# Patient Record
Sex: MALE | Race: WHITE | NOT HISPANIC OR LATINO | Employment: FULL TIME | URBAN - METROPOLITAN AREA
[De-identification: names, ages, dates, MRNs, and addresses within clinical notes are randomized per-mention and may not be internally consistent; named-entity substitution may affect disease eponyms.]

---

## 2018-06-12 ENCOUNTER — HOSPITAL ENCOUNTER (EMERGENCY)
Facility: MEDICAL CENTER | Age: 48
End: 2018-06-12
Attending: EMERGENCY MEDICINE
Payer: COMMERCIAL

## 2018-06-12 VITALS
RESPIRATION RATE: 14 BRPM | HEIGHT: 68 IN | TEMPERATURE: 98.4 F | SYSTOLIC BLOOD PRESSURE: 115 MMHG | HEART RATE: 90 BPM | OXYGEN SATURATION: 95 % | WEIGHT: 180.78 LBS | BODY MASS INDEX: 27.4 KG/M2 | DIASTOLIC BLOOD PRESSURE: 66 MMHG

## 2018-06-12 DIAGNOSIS — S90.425A BLISTER OF TOE OF LEFT FOOT WITHOUT INFECTION, INITIAL ENCOUNTER: ICD-10-CM

## 2018-06-12 PROCEDURE — 99283 EMERGENCY DEPT VISIT LOW MDM: CPT

## 2018-06-12 ASSESSMENT — PAIN SCALES - GENERAL: PAINLEVEL_OUTOF10: 8

## 2018-06-12 ASSESSMENT — LIFESTYLE VARIABLES: DO YOU DRINK ALCOHOL: NO

## 2018-06-12 NOTE — ED PROVIDER NOTES
"ED Provider Note    Scribed for Keegan Mayo M.D. by Valeria Ledezma. 6/12/2018  3:12 PM    Primary care provider: Pcp Pt States None  Means of arrival: Walk-In  History obtained from: Patient  History limited by: None    CHIEF COMPLAINT  Chief Complaint   Patient presents with   • Foot Pain     denies injury, pain x 2 days, reports that toes are rubbing against each other and is causing pain up to knee.       HPI  Héctor Cain is a 48 y.o. male who presents to the Emergency Department with Toe pain of the left foot onset a couple days ago. Patient states the pain radiates up to his calf and knee, making it difficult to walk.    REVIEW OF SYSTEMS  Pertinent positives include Toe pain of the left side, calf pain, knee pain.   Pertinent negatives include no other obvious injuries.   E.    PAST MEDICAL HISTORY  None noted    SURGICAL HISTORY  patient denies any surgical history    SOCIAL HISTORY  None noted    FAMILY HISTORY  None noted    CURRENT MEDICATIONS  None noted    ALLERGIES  No Known Allergies    PHYSICAL EXAM  VITAL SIGNS: /65   Pulse 89   Temp 36.9 °C (98.4 °F)   Resp 16   Ht 1.727 m (5' 8\")   Wt 82 kg (180 lb 12.4 oz)   SpO2 97%   BMI 27.49 kg/m²     Constitutional: Well developed, Well nourished, no acute distress, Non-toxic appearance.   HENT: Normocephalic, Atraumatic.  Oropharynx moist.   Eyes: PERRL, EOMI, Conjunctiva normal, No discharge.   CV: Good pulses  Thorax & Lungs: No respiratory distress.   Skin: Warm, Dry, No erythema, No rash.    Musculoskeletal: No major deformities noted.   Neurologic: Awake, alert. Moves all extremities spontaneously.  Psychiatric: Affect normal, Mood normal.  Extremities: Blister to the medial aspect of the left 5th toe, blister on lateral aspect of left 4th toe, mild lower extremity edema, slight tenderness to left calf, full range of motion of left knee.    COURSE & MEDICAL DECISION MAKING  Nursing notes, VS, PMSFHx reviewed in " chart.    3:12 PM - Patient seen and examined at bedside. Patient was advised to put band-aids over his blisters and corns to prevent them from rubbing against each other. There are npo signs of infection. The patient will be discharged with an information sheet on blisters and should return if symptoms worsen or if new symptoms arise. The patient understands and agrees to plan.     Decision Making:  Blisters to the toes, put a Band-Aid on the area, will discharge the patient home    The patient will return for new or worsening symptoms and is stable at the time of discharge.    DISPOSITION:  Patient will be discharged home in stable condition.    FOLLOW UP:  [facility], Emergency Dept  [address] [address] [phone]    If symptoms worsen      FINAL IMPRESSION  1. Blister of toe of left foot without infection, initial encounter          Valeria MA (Scribe), am scribing for, and in the presence of, Keegan Mayo M.D..    Electronically signed by: Valeria Ledezma (Florentin), 6/12/2018    IKeegan M.D. personally performed the services described in this documentation, as scribed by Valeria Ledezma in my presence, and it is both accurate and complete.    The note accurately reflects work and decisions made by me.  Keegan Mayo  6/12/2018  9:37 PM

## 2018-06-12 NOTE — ED TRIAGE NOTES
"Chief Complaint   Patient presents with   • Foot Pain     denies injury, pain x 2 days, reports that toes are rubbing against each other and is causing pain up to knee.     Blood pressure 118/65, pulse 89, temperature 36.9 °C (98.4 °F), resp. rate 16, height 1.727 m (5' 8\"), weight 82 kg (180 lb 12.4 oz), SpO2 97 %.    Pt informed of wait times. Educated on triage process.  Asked to return to triage RN for any new or worsening of symptoms. Thanked for patience.        "

## 2018-06-12 NOTE — ED NOTES
Patient was educated on discharge instructions.  Patient was informed about diagnosis, symptom management, risks, and home care instructions.  Patient verbalized understanding and signed discharge instructions.Copy of discharge instructions in chart.  Patient ambulated out with steady gait. Patient has personal belongings.

## 2018-06-12 NOTE — DISCHARGE INSTRUCTIONS
Blisters, Adult  A blister is a raised bubble of skin filled with liquid. Blisters often develop in an area of the skin that repeatedly rubs or presses against another surface (friction blister). Friction blisters can occur on any part of the body, but they usually develop on the hands or feet. Long-term pressure on the same area of the skin can also lead to areas of hardened skin (calluses).  What are the causes?  A blister can be caused by:  · An injury.  · A burn.  · An allergic reaction.  · An infection.  · Exposure to irritating chemicals.  · Friction, especially in an area with a lot of heat and moisture.  Friction blisters often result from:  · Sports.  · Repetitive activities.  · Using tools and doing other activities without wearing gloves.  · Shoes that are too tight or too loose.  What are the signs or symptoms?  A blister is often round and looks like a bump. It may:  · Itch.  · Be painful to the touch.  Before a blister forms, the skin may:  · Become red.  · Feel warm.  · Itch.  · Be painful to the touch.  How is this diagnosed?  A blister is diagnosed with a physical exam.  How is this treated?  Treatment usually involves protecting the area where the blister has formed until the skin has healed. Other treatments may include:  · A bandage (dressing) to cover the blister.  · Extra padding around and over the blister, so that it does not rub on anything.  · Antibiotic ointment.  Most blisters break open, dry up, and go away on their own within 1-2 weeks. Blisters that are very painful may be drained before they break open on their own. If the blister is large or painful, it can be drained by:  1. Sterilizing a small needle with rubbing alcohol.  2. Washing your hands with soap and water.  3. Inserting the needle in the edge of the blister to make a small hole. Some fluid will drain out of the hole. Let the top or roof of the blister stay in place. This helps the skin heal.  4. Washing the blister with  mild soap and water.  5. Covering the blister with antibiotic ointment, if prescribed by your health care provider, and a dressing.  Some blisters may need to be drained by a health care provider.  Follow these instructions at home:  · Protect the area where the blister has formed as told by your health care provider.  · Keep your blister clean and dry. This helps to prevent infection.  · Do not pop your blister. This can cause infection.  · If you were prescribed an antibiotic, use it as told by your health care provider. Do not stop using the antibiotic even if your condition improves.  · Wear different shoes until the blister heals.  · Avoid the activity that caused the blister until your blister heals.  · Check your blister every day for signs of infection. Check for:  ¨ More redness, swelling, or pain.  ¨ More fluid or blood.  ¨ Warmth.  ¨ Pus or a bad smell.  ¨ The blister getting better and then getting worse.  How is this prevented?  Taking these steps can help to prevent blisters that are caused by friction. Make sure you:  · Wear comfortable shoes that fit well.  · Always wear socks with shoes.  · Wear extra socks or use tape, bandages, or pads over blister-prone areas as needed. You may also apply petroleum jelly under bandages in blister-prone areas.  · Wear protective gear, such as gloves, when participating in sports or activities that can cause blisters.  · Wear loose-fitting, moisture-wicking clothes when participating in sports or activities.  · Use powders as needed to keep your feet dry.  Contact a health care provider if:  · You have more redness, swelling, or pain around your blister.  · You have more fluid or blood coming from your blister.  · Your blister feels warm to the touch.  · You have pus or a bad smell coming from your blister.  · You have a fever or chills.  · Your blister gets better and then it gets worse.  This information is not intended to replace advice given to you by your  health care provider. Make sure you discuss any questions you have with your health care provider.  Document Released: 01/25/2006 Document Revised: 08/16/2017 Document Reviewed: 06/30/2017  Elsevier Interactive Patient Education © 2017 [employer] Inc[employer]

## 2018-06-25 ENCOUNTER — HOSPITAL ENCOUNTER (EMERGENCY)
Facility: MEDICAL CENTER | Age: 48
End: 2018-06-25
Attending: EMERGENCY MEDICINE
Payer: COMMERCIAL

## 2018-06-25 ENCOUNTER — APPOINTMENT (OUTPATIENT)
Dept: RADIOLOGY | Facility: MEDICAL CENTER | Age: 48
End: 2018-06-25
Attending: EMERGENCY MEDICINE
Payer: COMMERCIAL

## 2018-06-25 VITALS
WEIGHT: 171.96 LBS | RESPIRATION RATE: 18 BRPM | OXYGEN SATURATION: 98 % | HEIGHT: 68 IN | HEART RATE: 66 BPM | TEMPERATURE: 98.3 F | DIASTOLIC BLOOD PRESSURE: 64 MMHG | SYSTOLIC BLOOD PRESSURE: 110 MMHG | BODY MASS INDEX: 26.06 KG/M2

## 2018-06-25 DIAGNOSIS — R42 DIZZINESS: ICD-10-CM

## 2018-06-25 DIAGNOSIS — J01.90 ACUTE NON-RECURRENT SINUSITIS, UNSPECIFIED LOCATION: ICD-10-CM

## 2018-06-25 DIAGNOSIS — R09.81 NASAL CONGESTION: ICD-10-CM

## 2018-06-25 LAB
ALBUMIN SERPL BCP-MCNC: 4 G/DL (ref 3.2–4.9)
ALBUMIN/GLOB SERPL: 1.5 G/DL
ALP SERPL-CCNC: 73 U/L (ref 30–99)
ALT SERPL-CCNC: 25 U/L (ref 2–50)
ANION GAP SERPL CALC-SCNC: 6 MMOL/L (ref 0–11.9)
AST SERPL-CCNC: 23 U/L (ref 12–45)
BASOPHILS # BLD AUTO: 0.5 % (ref 0–1.8)
BASOPHILS # BLD: 0.04 K/UL (ref 0–0.12)
BILIRUB SERPL-MCNC: 0.7 MG/DL (ref 0.1–1.5)
BUN SERPL-MCNC: 17 MG/DL (ref 8–22)
CALCIUM SERPL-MCNC: 9.1 MG/DL (ref 8.5–10.5)
CHLORIDE SERPL-SCNC: 104 MMOL/L (ref 96–112)
CO2 SERPL-SCNC: 29 MMOL/L (ref 20–33)
CREAT SERPL-MCNC: 0.95 MG/DL (ref 0.5–1.4)
EKG IMPRESSION: NORMAL
EOSINOPHIL # BLD AUTO: 0.14 K/UL (ref 0–0.51)
EOSINOPHIL NFR BLD: 1.6 % (ref 0–6.9)
ERYTHROCYTE [DISTWIDTH] IN BLOOD BY AUTOMATED COUNT: 37.2 FL (ref 35.9–50)
GLOBULIN SER CALC-MCNC: 2.7 G/DL (ref 1.9–3.5)
GLUCOSE SERPL-MCNC: 98 MG/DL (ref 65–99)
HCT VFR BLD AUTO: 47.9 % (ref 42–52)
HGB BLD-MCNC: 16.9 G/DL (ref 14–18)
IMM GRANULOCYTES # BLD AUTO: 0.03 K/UL (ref 0–0.11)
IMM GRANULOCYTES NFR BLD AUTO: 0.3 % (ref 0–0.9)
LYMPHOCYTES # BLD AUTO: 1.95 K/UL (ref 1–4.8)
LYMPHOCYTES NFR BLD: 22.1 % (ref 22–41)
MCH RBC QN AUTO: 31.4 PG (ref 27–33)
MCHC RBC AUTO-ENTMCNC: 35.3 G/DL (ref 33.7–35.3)
MCV RBC AUTO: 88.9 FL (ref 81.4–97.8)
MONOCYTES # BLD AUTO: 0.65 K/UL (ref 0–0.85)
MONOCYTES NFR BLD AUTO: 7.4 % (ref 0–13.4)
NEUTROPHILS # BLD AUTO: 6 K/UL (ref 1.82–7.42)
NEUTROPHILS NFR BLD: 68.1 % (ref 44–72)
NRBC # BLD AUTO: 0 K/UL
NRBC BLD-RTO: 0 /100 WBC
PLATELET # BLD AUTO: 417 K/UL (ref 164–446)
PMV BLD AUTO: 9.1 FL (ref 9–12.9)
POTASSIUM SERPL-SCNC: 4.7 MMOL/L (ref 3.6–5.5)
PROT SERPL-MCNC: 6.7 G/DL (ref 6–8.2)
RBC # BLD AUTO: 5.39 M/UL (ref 4.7–6.1)
SODIUM SERPL-SCNC: 139 MMOL/L (ref 135–145)
TSH SERPL DL<=0.005 MIU/L-ACNC: 1.25 UIU/ML (ref 0.38–5.33)
WBC # BLD AUTO: 8.8 K/UL (ref 4.8–10.8)

## 2018-06-25 PROCEDURE — 80053 COMPREHEN METABOLIC PANEL: CPT

## 2018-06-25 PROCEDURE — 84443 ASSAY THYROID STIM HORMONE: CPT

## 2018-06-25 PROCEDURE — 99284 EMERGENCY DEPT VISIT MOD MDM: CPT

## 2018-06-25 PROCEDURE — 85025 COMPLETE CBC W/AUTO DIFF WBC: CPT

## 2018-06-25 PROCEDURE — 93005 ELECTROCARDIOGRAM TRACING: CPT | Performed by: EMERGENCY MEDICINE

## 2018-06-25 PROCEDURE — 70450 CT HEAD/BRAIN W/O DYE: CPT

## 2018-06-25 RX ORDER — AMOXICILLIN AND CLAVULANATE POTASSIUM 875; 125 MG/1; MG/1
1 TABLET, FILM COATED ORAL 2 TIMES DAILY
Qty: 20 TAB | Refills: 0 | Status: SHIPPED | OUTPATIENT
Start: 2018-06-25 | End: 2018-07-05

## 2018-06-25 NOTE — ED NOTES
Discharge instructions given to patient, prescriptions provided, a verbal understanding of all instructions was stated. IV removed, cathlon intact, site without s/s of infection. Pt preferred to walk out accompanied by self VSS,  all belongings accounted for.

## 2018-06-25 NOTE — DISCHARGE INSTRUCTIONS
Follow-up with primary care this week for reevaluation, to establish care, for medication management close blood pressure monitoring.    Augmentin twice daily for 10 days for sinus infection.  Over-the-counter medication such as Afrin, as needed for congestion.  Encourage oral fluid hydration.  A coolmist humidifier may be beneficial as well.    Return to the emergency department for headache, altered mental status, syncope, fever or other new concerns.    Sinusitis, Adult  Sinusitis is soreness and inflammation of your sinuses. Sinuses are hollow spaces in the bones around your face. They are located:  · Around your eyes.  · In the middle of your forehead.  · Behind your nose.  · In your cheekbones.  Your sinuses and nasal passages are lined with a stringy fluid (mucus). Mucus normally drains out of your sinuses. When your nasal tissues get inflamed or swollen, the mucus can get trapped or blocked so air cannot flow through your sinuses. This lets bacteria, viruses, and funguses grow, and that leads to infection.  Follow these instructions at home:  Medicines  · Take, use, or apply over-the-counter and prescription medicines only as told by your doctor. These may include nasal sprays.  · If you were prescribed an antibiotic medicine, take it as told by your doctor. Do not stop taking the antibiotic even if you start to feel better.  Hydrate and Humidify  · Drink enough water to keep your pee (urine) clear or pale yellow.  · Use a cool mist humidifier to keep the humidity level in your home above 50%.  · Breathe in steam for 10-15 minutes, 3-4 times a day or as told by your doctor. You can do this in the bathroom while a hot shower is running.  · Try not to spend time in cool or dry air.  Rest  · Rest as much as possible.  · Sleep with your head raised (elevated).  · Make sure to get enough sleep each night.  General instructions  · Put a warm, moist washcloth on your face 3-4 times a day or as told by your doctor.  This will help with discomfort.  · Wash your hands often with soap and water. If there is no soap and water, use hand .  · Do not smoke. Avoid being around people who are smoking (secondhand smoke).  · Keep all follow-up visits as told by your doctor. This is important.  Contact a doctor if:  · You have a fever.  · Your symptoms get worse.  · Your symptoms do not get better within 10 days.  Get help right away if:  · You have a very bad headache.  · You cannot stop throwing up (vomiting).  · You have pain or swelling around your face or eyes.  · You have trouble seeing.  · You feel confused.  · Your neck is stiff.  · You have trouble breathing.  This information is not intended to replace advice given to you by your health care provider. Make sure you discuss any questions you have with your health care provider.  Document Released: 06/05/2009 Document Revised: 08/13/2017 Document Reviewed: 10/12/2016  [employer] Interactive Patient Education © 2017 [employer]      Dizziness  Dizziness is a common problem. It makes you feel unsteady or lightheaded. You may feel like you are about to pass out (faint). Dizziness can lead to injury if you stumble or fall. Anyone can get dizzy, but dizziness is more common in older adults. This condition can be caused by a number of things, including:  · Medicines.  · Dehydration.  · Illness.  Follow these instructions at home:  Following these instructions may help with your condition:  Eating and drinking  · Drink enough fluid to keep your pee (urine) clear or pale yellow. This helps to keep you from getting dehydrated. Try to drink more clear fluids, such as water.  · Do not drink alcohol.  · Limit how much caffeine you drink or eat if told by your doctor.  · Limit how much salt you drink or eat if told by your doctor.  Activity  · Avoid making quick movements.  ¨ When you stand up from sitting in a chair, steady yourself until you feel okay.  ¨ In the morning, first sit up  on the side of the bed. When you feel okay, stand slowly while you hold onto something. Do this until you know that your balance is fine.  · Move your legs often if you need to  one place for a long time. Tighten and relax your muscles in your legs while you are standing.  · Do not drive or use heavy machinery if you feel dizzy.  · Avoid bending down if you feel dizzy. Place items in your home so that they are easy for you to reach without leaning over.  Lifestyle  · Do not use any tobacco products, including cigarettes, chewing tobacco, or electronic cigarettes. If you need help quitting, ask your doctor.  · Try to lower your stress level, such as with yoga or meditation. Talk with your doctor if you need help.  General instructions  · Watch your dizziness for any changes.  · Take medicines only as told by your doctor. Talk with your doctor if you think that your dizziness is caused by a medicine that you are taking.  · Tell a friend or a family member that you are feeling dizzy. If he or she notices any changes in your behavior, have this person call your doctor.  · Keep all follow-up visits as told by your doctor. This is important.  Contact a doctor if:  · Your dizziness does not go away.  · Your dizziness or light-headedness gets worse.  · You feel sick to your stomach (nauseous).  · You have trouble hearing.  · You have new symptoms.  · You are unsteady on your feet or you feel like the room is spinning.  Get help right away if:  · You throw up (vomit) or have diarrhea and are unable to eat or drink anything.  · You have trouble:  ¨ Talking.  ¨ Walking.  ¨ Swallowing.  ¨ Using your arms, hands, or legs.  · You feel generally weak.  · You are not thinking clearly or you have trouble forming sentences. It may take a friend or family member to notice this.  · You have:  ¨ Chest pain.  ¨ Pain in your belly (abdomen).  ¨ Shortness of breath.  ¨ Sweating.  · Your vision changes.  · You are bleeding.  · You  have a headache.  · You have neck pain or a stiff neck.  · You have a fever.  This information is not intended to replace advice given to you by your health care provider. Make sure you discuss any questions you have with your health care provider.  Document Released: 12/06/2012 Document Revised: 05/25/2017 Document Reviewed: 12/14/2015  [employer] Interactive Patient Education © 2017 [employer] Inc.

## 2018-06-25 NOTE — ED TRIAGE NOTES
"Chief Complaint   Patient presents with   • Fatigue     Pt states he feels weak and nauseous starting yesterday.    • Congestion     Pt states he has sinus congestion for several weeks but denies allergies.     /76   Pulse 74   Temp 36.8 °C (98.3 °F)   Resp 18   Ht 1.727 m (5' 8\")   Wt 78 kg (171 lb 15.3 oz)   SpO2 98%   BMI 26.15 kg/m²   Pt placed back in lobby, educated on triage process, and told to inform staff of any change in condition.     "

## 2018-06-25 NOTE — ED PROVIDER NOTES
"ED Provider Note    CHIEF COMPLAINT  Chief Complaint   Patient presents with   • Fatigue     Pt states he feels weak and nauseous starting yesterday.    • Congestion     Pt states he has sinus congestion for several weeks but denies allergies.       HPI  Héctor Cain is a 48 y.o. male who ambulates to the emergency department for dizziness.  Patient describes lightheaded \"I feel like I am going to pass out, like it cannot stand straight.\"  Patient states that \"I thought I was just low on fluid so I drink water and Gatorade yesterday but it does not seem to help.\"  Patient also describes \"congestion and a sinus thing,\" for 3 weeks.  Nasal congestion, facial pressure.  Denies fever chills.  Denies nausea or vomiting.  Denies visual changes, double vision or blurred vision.  Denies slurred speech or difficulty speaking.  Denies focal extremity weakness or paresthesias despite generalized weakness and fatigue for 2 days as well.  Denies chest pain, palpitations, shortness of breath or syncope.  Denies history of similar symptoms.    REVIEW OF SYSTEMS  See HPI for further details. All other systems are negative.     PAST MEDICAL HISTORY   Denies    SOCIAL HISTORY  Social History     Social History Main Topics   • Smoking status: Current Some Day Smoker     Types: Cigarettes   • Smokeless tobacco: Never Used   • Alcohol use Yes      Comment: on occaison   • Drug use: No   • Sexual activity: Not on file       SURGICAL HISTORY  patient denies any surgical history    CURRENT MEDICATIONS  Home Medications     Reviewed by Hayley Fry, Student (Nurse Apprentice) on 06/25/18 at 0735  Med List Status: Complete   Medication Last Dose Status        Patient Karson Taking any Medications                       ALLERGIES  No Known Allergies    PHYSICAL EXAM  VITAL SIGNS: /64   Pulse 72   Temp 36.8 °C (98.3 °F)   Resp 18   Ht 1.727 m (5' 8\")   Wt 78 kg (171 lb 15.3 oz)   SpO2 98%   BMI 26.15 kg/m²   Pulse ox " interpretation: I interpret this pulse ox as normal.  Constitutional: Alert in no apparent distress.  Flat affect.  Cooperative.  HENT: Normocephalic, atraumatic. Bilateral external ears normal, TMs clear bilaterally.  Nose normal. Moist mucous membranes.  Oropharynx within normal limits, no erythema, edema or exudate.  Mild tenderness to percussion bilateral maxillary sinuses.  Eyes: Pupils are equal and reactive, 4 to 2 bilaterally.  Conjunctiva normal.  EOMI.  No nystagmus.  Neck: Normal range of motion, Supple. No meningeal irritation.  Lymphatic: No lymphadenopathy noted.   Cardiovascular: Regular rate and rhythm, no murmurs. Distal pulses intact.  No peripheral edema.  Thorax & Lungs: Normal breath sounds.  No wheezing/rales/ronchi. No increased work of breathing  Abdomen: Soft, non-distended, non-tender to palpation. No palpable or pulsatile masses.  Skin: Warm, Dry, No erythema, No rash.   Musculoskeletal: Good range of motion in all major joints.  Neurologic: Alert and oriented ×4.  Speech clear and cohesive.  No facial droop.  5 out of 5 strength in 4 extremities.  No pronator drift.  Heel to shin and finger to nose intact bilaterally.  Sensation intact to light touch equally at 4 extremities.  Psychiatric: Flat affect otherwise judgment normal, Mood normal.       DIAGNOSTIC STUDIES / PROCEDURES    EKG  I interpreted this EKG myself.  This is a 12-lead study.  The rhythm is sinus with a rate of 64.  There are no ST segment nor T wave abnormalities.  Normal intervals.  Interpretation: No ST segment elevation myocardial infarction.  No ectopy.  No arrhythmia.  No previous for comparison.    LABS  Results for orders placed or performed during the hospital encounter of 06/25/18   CBC WITH DIFFERENTIAL   Result Value Ref Range    WBC 8.8 4.8 - 10.8 K/uL    RBC 5.39 4.70 - 6.10 M/uL    Hemoglobin 16.9 14.0 - 18.0 g/dL    Hematocrit 47.9 42.0 - 52.0 %    MCV 88.9 81.4 - 97.8 fL    MCH 31.4 27.0 - 33.0 pg    MCHC  35.3 33.7 - 35.3 g/dL    RDW 37.2 35.9 - 50.0 fL    Platelet Count 417 164 - 446 K/uL    MPV 9.1 9.0 - 12.9 fL    Neutrophils-Polys 68.10 44.00 - 72.00 %    Lymphocytes 22.10 22.00 - 41.00 %    Monocytes 7.40 0.00 - 13.40 %    Eosinophils 1.60 0.00 - 6.90 %    Basophils 0.50 0.00 - 1.80 %    Immature Granulocytes 0.30 0.00 - 0.90 %    Nucleated RBC 0.00 /100 WBC    Neutrophils (Absolute) 6.00 1.82 - 7.42 K/uL    Lymphs (Absolute) 1.95 1.00 - 4.80 K/uL    Monos (Absolute) 0.65 0.00 - 0.85 K/uL    Eos (Absolute) 0.14 0.00 - 0.51 K/uL    Baso (Absolute) 0.04 0.00 - 0.12 K/uL    Immature Granulocytes (abs) 0.03 0.00 - 0.11 K/uL    NRBC (Absolute) 0.00 K/uL   COMP METABOLIC PANEL   Result Value Ref Range    Sodium 139 135 - 145 mmol/L    Potassium 4.7 3.6 - 5.5 mmol/L    Chloride 104 96 - 112 mmol/L    Co2 29 20 - 33 mmol/L    Anion Gap 6.0 0.0 - 11.9    Glucose 98 65 - 99 mg/dL    Bun 17 8 - 22 mg/dL    Creatinine 0.95 0.50 - 1.40 mg/dL    Calcium 9.1 8.5 - 10.5 mg/dL    AST(SGOT) 23 12 - 45 U/L    ALT(SGPT) 25 2 - 50 U/L    Alkaline Phosphatase 73 30 - 99 U/L    Total Bilirubin 0.7 0.1 - 1.5 mg/dL    Albumin 4.0 3.2 - 4.9 g/dL    Total Protein 6.7 6.0 - 8.2 g/dL    Globulin 2.7 1.9 - 3.5 g/dL    A-G Ratio 1.5 g/dL   TSH   Result Value Ref Range    TSH 1.250 0.380 - 5.330 uIU/mL   ESTIMATED GFR   Result Value Ref Range    GFR If African American >60 >60 mL/min/1.73 m 2    GFR If Non African American >60 >60 mL/min/1.73 m 2   EKG (NOW)   Result Value Ref Range    Report       [facility] Emergency Dept.    Test Date:  2018  Pt Name:    DANIEL SARMIENTO                 Department: ER  MRN:        [id number]                      Room:       [facility]  Gender:     Male                         Technician: [id number]  :        1970                   Requested By:KARIME IVY  Order #:    [id number]                    Reading MD: KARIME IVY, DO    Measurements  Intervals                                 Axis  Rate:       64                           P:          66  OH:         164                          QRS:        68  QRSD:       96                           T:          58  QT:         412  QTc:        425    Interpretive Statements  SINUS RHYTHM  No previous ECG available for comparison    Electronically Signed On 6- 11:00:13 PDT by NEEMA IVY DO         RADIOLOGY  CT-HEAD W/O   Final Result         1. No evidence of acute cerebral infarction, hemorrhage or mass lesion.      2. Left maxillary and ethmoid sinusitis.          COURSE & MEDICAL DECISION MAKING  Nursing notes and vital signs were reviewed. (See chart for details)  The patients records were reviewed, history was obtained from the patient ;     ED evaluation for congestion, dizziness and fatigue is mostly unrevealing, however symptomatology may be secondary to the sinusitis.  Patient with symptoms for 2-3 weeks, clinical sinusitis confirmed with CT head.  Otherwise CT unrevealing, patient is neurologically intact.  No exam evidence for cerebellar dysfunction.  Labs otherwise unremarkable, no electrolyte derangement.  EKG within normal limits, no ischemia or arrhythmia, continuous telemetry without ectopy or arrhythmia.  Vital signs are stable without fever tachycardia, blood pressures well controlled.  Patient ambulates independently.    Patient is stable for discharge at this time, anticipatory guidance provided, Augmentin for 10 days, over-the-counter medications for nasal congestion, close follow-up is encouraged, and strict ED return instructions have been detailed. Patient is agreeable to the disposition and plan.    Patient's blood pressure was elevated in the emergency department, and has been referred to primary care for close monitoring.      FINAL IMPRESSION  (J01.90) Acute non-recurrent sinusitis, unspecified location  (R42) Dizziness  (R09.81) Nasal congestion      Electronically signed by: Neema Ivy, 6/25/2018 8:52  AM      This dictation was created using voice recognition software. The accuracy of the dictation is limited to the abilities of the software. I expect there may be some errors of grammar and possibly content. The nursing notes were reviewed and certain aspects of this information were incorporated into this note.

## 2018-07-03 ENCOUNTER — HOSPITAL ENCOUNTER (EMERGENCY)
Facility: MEDICAL CENTER | Age: 48
End: 2018-07-03
Attending: EMERGENCY MEDICINE
Payer: COMMERCIAL

## 2018-07-03 VITALS
WEIGHT: 178.79 LBS | RESPIRATION RATE: 14 BRPM | TEMPERATURE: 97.5 F | HEART RATE: 69 BPM | DIASTOLIC BLOOD PRESSURE: 71 MMHG | BODY MASS INDEX: 27.1 KG/M2 | SYSTOLIC BLOOD PRESSURE: 106 MMHG | OXYGEN SATURATION: 96 % | HEIGHT: 68 IN

## 2018-07-03 DIAGNOSIS — S46.212A BICEPS STRAIN, LEFT, INITIAL ENCOUNTER: ICD-10-CM

## 2018-07-03 DIAGNOSIS — M54.42 ACUTE LEFT-SIDED LOW BACK PAIN WITH LEFT-SIDED SCIATICA: ICD-10-CM

## 2018-07-03 PROCEDURE — 99283 EMERGENCY DEPT VISIT LOW MDM: CPT

## 2018-07-03 RX ORDER — CYCLOBENZAPRINE HCL 10 MG
10 TABLET ORAL 3 TIMES DAILY PRN
Qty: 30 TAB | Refills: 0 | Status: SHIPPED | OUTPATIENT
Start: 2018-07-03

## 2018-07-03 NOTE — ED TRIAGE NOTES
"Chief Complaint   Patient presents with   • Hip Pain     x a couple of days, unknown injury. pt reports that he is currently homeless.      Pt reports that pain is predominately on left side. Odd affect, but pleasant.  Blood pressure 106/71, pulse 69, temperature 36.4 °C (97.5 °F), resp. rate 14, height 1.727 m (5' 8\"), weight 81.1 kg (178 lb 12.7 oz), SpO2 96 %.    Pt informed of wait times. Educated on triage process.  Asked to return to triage RN for any new or worsening of symptoms. Thanked for patience.        "

## 2018-07-03 NOTE — ED NOTES
Pt received discharge, follow up and return to ED instructions; pt verbalized understanding. Pt denied any needs/pain. Pt ambulated with a steady gait. Pt received (1) paper rx at discharge.

## 2018-07-03 NOTE — ED PROVIDER NOTES
"ED Provider Note    Scribed for Ilana Matias M.D. by Valeria Ledezma. 7/3/2018, 9:44 AM.    Primary care provider: Pcp Pt States None  Means of arrival: Walk-in  History obtained from: Patient  History limited by: None    CHIEF COMPLAINT  Chief Complaint   Patient presents with   • Hip Pain     x a couple of days, unknown injury. pt reports that he is currently homeless.        HPI  Héctor Cain is a 48 y.o. homeless male who presents to the Emergency Department for evaluation of left arm and left hip pain, onset yesterday. Patient describes his hip pain as \"dull, sharp\". He states he has been walking more. No relief in with Naprosyn or Motrin. Patient has experienced this in the past and his symptoms typically improve with rest, however he states he cannot rest well enough in a homeless shelter. The pain radiates to his left buttocks, down his left leg, and intermittently into this left shoulder. Patient denies any recent falls or injuries. No weakness or numbness in left leg, denies incontinence, fever, chills.. No chest pain.     REVIEW OF SYSTEMS  Pertinent positives include left arm pain, left hip pain with radiation to left buttocks, left leg, and left shoulder. Pertinent negatives include no falls, weakness, numbness, chest pain. See HPI for further details. E.     PAST MEDICAL HISTORY       SURGICAL HISTORY  patient denies any surgical history    SOCIAL HISTORY  Social History   Substance Use Topics   • Smoking status: Current Some Day Smoker     Types: Cigarettes   • Smokeless tobacco: Never Used   • Alcohol use Yes      Comment: on occaison      History   Drug Use No       FAMILY HISTORY  None noted    CURRENT MEDICATIONS  No current facility-administered medications for this encounter.     Current Outpatient Prescriptions:   •  cyclobenzaprine (FLEXERIL) 10 MG Tab, Take 1 Tab by mouth 3 times a day as needed for Muscle Spasms., Disp: 30 Tab, Rfl: 0  •  amoxicillin-clavulanate (AUGMENTIN) " "875-125 MG Tab, Take 1 Tab by mouth 2 times a day for 10 days., Disp: 20 Tab, Rfl: 0    ALLERGIES  No Known Allergies    PHYSICAL EXAM  VITAL SIGNS: /71   Pulse 69   Temp 36.4 °C (97.5 °F)   Resp 14   Ht 1.727 m (5' 8\")   Wt 81.1 kg (178 lb 12.7 oz)   SpO2 96%   BMI 27.19 kg/m²     General: WDWN, nontoxic appearing in NAD; A+Ox3; V/S as above, afebrile  Skin: warm and dry; good color; no rash   HEENT: NCAT; EOMs intact; PERRL; no scleral icterus   Neck: FROM; no meningismus, no LAD   Back: No midline point tenderness or step-offs. No evidence of trauma, no erythema or crepitus. No palpable muscle spasm.  Extremities: DAVIS x 4; no e/o trauma; no pedal edema. Patient complains of pain with flexion of left elbow, no bony-point tenderness or deformity.   Neurologic: CNs III-XII grossly intact; speech clear; distal sensation intact; strength 5/5 UE/LEs   Psychiatric: Appropriate affect, normal mood                                                          COURSE & MEDICAL DECISION MAKING  Pertinent Labs & Imaging studies reviewed. (See chart for details)    Héctor Cain is a 48 y.o. male who presents complaining of left buttock pain radiating into the left leg most likely consistent with sciatica.  No red flag signs or symptoms with regards to back pain.  I do not suspect cauda equina or epidural abscess.  Imaging is not indicated today given no history of trauma.     The patient will be discharged with continued Naprosyn and Flexeril and should return if symptoms worsen or if new symptoms arise such as increased pain, fevers, weakness, or redness. The patient understands and agrees to plan.       FINAL IMPRESSION  1. Acute left-sided low back pain with left-sided sciatica    2. Biceps strain, left, initial encounter          Valeria MA (Florentin), am scribing for, and in the presence of, Ilana Matias M.D..    Electronically signed by: Valeria Kim), 7/3/2018    Ilana MA" CAPRICE Matias. personally performed the services described in this documentation, as scribed by Valeria Ledezma in my presence, and it is both accurate and complete.    The note accurately reflects work and decisions made by me.  Ilana Matias  7/3/2018  10:07 AM

## 2018-07-03 NOTE — DISCHARGE INSTRUCTIONS
Continue with Naprosyn twice daily as needed for pain; make sure you take this with food and water  Take Flexeril as needed for muscle spasm  Return to the ER for fever, increased pain, weakness, incontinence, or other concerns  Establish care with a primary care provider at the [facility] or [facility]  Salon Pas topically for pain; may be purchased over-the-counter        Back Pain, Adult  Back pain is very common in adults. The cause of back pain is rarely dangerous and the pain often gets better over time. The cause of your back pain may not be known. Some common causes of back pain include:  · Strain of the muscles or ligaments supporting the spine.  · Wear and tear (degeneration) of the spinal disks.  · Arthritis.  · Direct injury to the back.  For many people, back pain may return. Since back pain is rarely dangerous, most people can learn to manage this condition on their own.  Follow these instructions at home:  Watch your back pain for any changes. The following actions may help to lessen any discomfort you are feeling:  · Remain active. It is stressful on your back to sit or  one place for long periods of time. Do not sit, drive, or  one place for more than 30 minutes at a time. Take short walks on even surfaces as soon as you are able. Try to increase the length of time you walk each day.  · Exercise regularly as directed by your health care provider. Exercise helps your back heal faster. It also helps avoid future injury by keeping your muscles strong and flexible.  · Do not stay in bed. Resting more than 1-2 days can delay your recovery.  · Pay attention to your body when you bend and lift. The most comfortable positions are those that put less stress on your recovering back. Always use proper lifting techniques, including:  ¨ Bending your knees.  ¨ Keeping the load close to your body.  ¨ Avoiding twisting.  · Find a comfortable position to sleep. Use a firm mattress and lie on your side  with your knees slightly bent. If you lie on your back, put a pillow under your knees.  · Avoid feeling anxious or stressed. Stress increases muscle tension and can worsen back pain. It is important to recognize when you are anxious or stressed and learn ways to manage it, such as with exercise.  · Take medicines only as directed by your health care provider. Over-the-counter medicines to reduce pain and inflammation are often the most helpful. Your health care provider may prescribe muscle relaxant drugs. These medicines help dull your pain so you can more quickly return to your normal activities and healthy exercise.  · Apply ice to the injured area:  ¨ Put ice in a plastic bag.  ¨ Place a towel between your skin and the bag.  ¨ Leave the ice on for 20 minutes, 2-3 times a day for the first 2-3 days. After that, ice and heat may be alternated to reduce pain and spasms.  · Maintain a healthy weight. Excess weight puts extra stress on your back and makes it difficult to maintain good posture.  Contact a health care provider if:  · You have pain that is not relieved with rest or medicine.  · You have increasing pain going down into the legs or buttocks.  · You have pain that does not improve in one week.  · You have night pain.  · You lose weight.  · You have a fever or chills.  Get help right away if:  · You develop new bowel or bladder control problems.  · You have unusual weakness or numbness in your arms or legs.  · You develop nausea or vomiting.  · You develop abdominal pain.  · You feel faint.  This information is not intended to replace advice given to you by your health care provider. Make sure you discuss any questions you have with your health care provider.  Document Released: 12/18/2006 Document Revised: 04/27/2017 Document Reviewed: 04/21/2015  [employer] Interactive Patient Education © 2017 [employer]

## 2018-07-09 ENCOUNTER — HOSPITAL ENCOUNTER (EMERGENCY)
Facility: MEDICAL CENTER | Age: 48
End: 2018-07-09
Attending: EMERGENCY MEDICINE
Payer: COMMERCIAL

## 2018-07-09 VITALS
HEART RATE: 89 BPM | DIASTOLIC BLOOD PRESSURE: 72 MMHG | HEIGHT: 68 IN | RESPIRATION RATE: 16 BRPM | TEMPERATURE: 99.4 F | SYSTOLIC BLOOD PRESSURE: 124 MMHG | OXYGEN SATURATION: 95 % | WEIGHT: 174.16 LBS | BODY MASS INDEX: 26.4 KG/M2

## 2018-07-09 DIAGNOSIS — G89.4 CHRONIC PAIN SYNDROME: ICD-10-CM

## 2018-07-09 PROCEDURE — 99283 EMERGENCY DEPT VISIT LOW MDM: CPT

## 2018-07-09 PROCEDURE — 99284 EMERGENCY DEPT VISIT MOD MDM: CPT

## 2018-07-09 RX ORDER — PREGABALIN 75 MG/1
75 CAPSULE ORAL 2 TIMES DAILY
Qty: 60 CAP | Refills: 0 | Status: SHIPPED | OUTPATIENT
Start: 2018-07-09 | End: 2018-08-08

## 2018-07-09 ASSESSMENT — ENCOUNTER SYMPTOMS
NEUROLOGICAL NEGATIVE: 1
NECK PAIN: 1

## 2018-07-09 NOTE — DISCHARGE INSTRUCTIONS
Chronic Pain Management  Managing chronic pain is not easy. The goal is to provide as much pain relief as possible. There are emotional as well as physical problems. Chronic pain may lead to symptoms of depression which magnify those of the pain.  Problems may include:  · Anxiety.  · Sleep disturbances.  · Confused thinking.  · Feeling cranky.  · Fatigue.  · Weight gain or loss.  Identify the source of the pain first, if possible. The pain may be masking another problem. Try to find a pain management specialist or clinic. Work with a team to create a treatment plan for you.  MEDICATIONS  · May include narcotics or opioids. Larger than normal doses may be needed to control your pain.  · Drugs for depression may help.  · Over-the-counter medicines may help for some conditions. These drugs may be used along with others for better pain relief.  · May be injected into sites such as the spine and joints. Injections may have to be repeated if they wear off.  THERAPY MAY INCLUDE:  · Working with a physical therapist to keep from getting stiff.  · Regular, gentle exercise.  · Cognitive or behavioral therapy.  · Using complementary or integrative medicine such as:  · Acupuncture.  · Massage, Reiki, or Rolfing.  · Aroma, color, light, or sound therapy.  · Group support.  FOR MORE INFORMATION  http://www.painfoundation.org.  American Chronic Pain Association http://www.thealpa.org.  Document Released: 01/25/2006 Document Revised: 03/11/2013 Document Reviewed: 03/05/2009  [employer]® Patient Information ©2014 [employer].      Neuropathic Pain  Introduction  Neuropathic pain is pain caused by damage to the nerves that are responsible for certain sensations in your body (sensory nerves). The pain can be caused by damage to:  · The sensory nerves that send signals to your spinal cord and brain (peripheral nervous system).  · The sensory nerves in your brain or spinal cord (central nervous system).  Neuropathic pain can make you  more sensitive to pain. What would be a minor sensation for most people may feel very painful if you have neuropathic pain. This is usually a long-term condition that can be difficult to treat. The type of pain can differ from person to person. It may start suddenly (acute), or it may develop slowly and last for a long time (chronic). Neuropathic pain may come and go as damaged nerves heal or may stay at the same level for years. It often causes emotional distress, loss of sleep, and a lower quality of life.  What are the causes?  The most common cause of damage to a sensory nerve is diabetes. Many other diseases and conditions can also cause neuropathic pain. Causes of neuropathic pain can be classified as:  · Toxic. Many drugs and chemicals can cause toxic damage. The most common cause of toxic neuropathic pain is damage from drug treatment for cancer (chemotherapy).  · Metabolic. This type of pain can happen when a disease causes imbalances that damage nerves. Diabetes is the most common of these diseases. Vitamin B deficiency caused by long-term alcohol abuse is another common cause.  · Traumatic. Any injury that cuts, crushes, or stretches a nerve can cause damage and pain. A common example is feeling pain after losing an arm or leg (phantom limb pain).  · Compression-related. If a sensory nerve gets trapped or compressed for a long period of time, the blood supply to the nerve can be cut off.  · Vascular. Many blood vessel diseases can cause neuropathic pain by decreasing blood supply and oxygen to nerves.  · Autoimmune. This type of pain results from diseases in which the body’s defense system mistakenly attacks sensory nerves. Examples of autoimmune diseases that can cause neuropathic pain include lupus and multiple sclerosis.  · Infectious. Many types of viral infections can damage sensory nerves and cause pain. Shingles infection is a common cause of this type of pain.  · Inherited. Neuropathic pain can be  a symptom of many diseases that are passed down through families (genetic).  What are the signs or symptoms?  The main symptom is pain. Neuropathic pain is often described as:  · Burning.  · Shock-like.  · Stinging.  · Hot or cold.  · Itching.  How is this diagnosed?  No single test can diagnose neuropathic pain. Your health care provider will do a physical exam and ask you about your pain. You may use a pain scale to describe how bad your pain is. You may also have tests to see if you have a high sensitivity to pain and to help find the cause and location of any sensory nerve damage. These tests may include:  · Imaging studies, such as:  ¨ X-rays.  ¨ CT scan.  ¨ MRI.  · Nerve conduction studies to test how well nerve signals travel through your sensory nerves (electrodiagnostic testing).  · Stimulating your sensory nerves through electrodes on your skin and measuring the response in your spinal cord and brain (somatosensory evoked potentials).  How is this treated?  Treatment for neuropathic pain may change over time. You may need to try different treatment options or a combination of treatments. Some options include:  · Over-the-counter pain relievers.  · Prescription medicines. Some medicines used to treat other conditions may also help neuropathic pain. These include medicines to:  ¨ Control seizures (anticonvulsants).  ¨ Relieve depression (antidepressants).  · Prescription-strength pain relievers (narcotics). These are usually used when other pain relievers do not help.  · Transcutaneous nerve stimulation (TENS). This uses electrical currents to block painful nerve signals. The treatment is painless.  · Topical and local anesthetics. These are medicines that numb the nerves. They can be injected as a nerve block or applied to the skin.  · Alternative treatments, such as:  ¨ Acupuncture.  ¨ Meditation.  ¨ Massage.  ¨ Physical therapy.  ¨ Pain management programs.  ¨ Counseling.  Follow these instructions at  home:  · Learn as much as you can about your condition.  · Take medicines only as directed by your health care provider.  · Work closely with all your health care providers to find what works best for you.  · Have a good support system at home.  · Consider joining a chronic pain support group.  Contact a health care provider if:  · Your pain treatments are not helping.  · You are having side effects from your medicines.  · You are struggling with fatigue, mood changes, depression, or anxiety.  This information is not intended to replace advice given to you by your health care provider. Make sure you discuss any questions you have with your health care provider.  Document Released: 09/14/2005 Document Revised: 07/07/2017 Document Reviewed: 05/28/2015  © 2017 Elsevier

## 2018-07-09 NOTE — ED NOTES
"Pt ambulates to triage with c/c \"pain from my left foot to my left knee to my left hip to my left shoulder\" since going on a walk this AM.  Pt states \"this pain is from the shelter being so cold at night.\"  A&ox4.  Ambulates slowly without difficulty.  Pt to lobby & advised to inform RN of any changes.    *Pt verbally upset with shelter- \"they make me leave during the day, I end up doing a lot of walking & that really hurts me.\"    "

## 2018-07-09 NOTE — ED NOTES
Pt received discharge instructions and prescription, pt understood all including follow up. Pt out to lobby with no difficulty

## 2018-07-09 NOTE — ED PROVIDER NOTES
ED Provider Note    Scribed for Dereje Neves M.D. by Alec Castillo. 7/9/2018, 2:25 PM.    Primary care provider: Pcp Pt States None  Means of arrival: walk in  History obtained from: patient  History limited by: none    CHIEF COMPLAINT  Chief Complaint   Patient presents with   • Pain     c/c left foot, left knee, left hip, left shoulder pain since walking this AM, denies trauma to area         HPI  Héctor Cain is a 48 y.o. male with a history of chronic musculoskeletal pain for 20 years who presents to the Emergency Department complaining of sudden left leg pain starting this morning. He describes his pain as severe. Patient localizes his pain to the distal lower left extremity and to the left hip. He states that his pain came on suddenly after walking this morning. Patient reports associated left sided neck pain, and left shoulder pain. He is homeless and states that when he is put out from the shelter during the day, he ends up walking a lot which exacerbates his chronic pain. Patient states that he takes Naproxen and flexeril for his chronic pain, but does not have a primary and is obtaining these medications through the emergency department. He denies trauma, numbness.     REVIEW OF SYSTEMS  Review of Systems   Musculoskeletal: Positive for neck pain.        Shoulder pain, hip pain, leg pain.   Negative trauma   Neurological: Negative.     C.    PAST MEDICAL HISTORY   Chronic musculoskeletal pain.     SURGICAL HISTORY  patient denies any surgical history    SOCIAL HISTORY  Social History   Substance Use Topics   • Smoking status: Current Some Day Smoker     Types: Cigarettes   • Smokeless tobacco: Never Used   • Alcohol use Yes      Comment: on occaison      History   Drug Use No       FAMILY HISTORY  History reviewed. No pertinent family history.    CURRENT MEDICATIONS  Current Outpatient Prescriptions:   •  cyclobenzaprine (FLEXERIL) 10 MG Tab, Take 1 Tab by mouth 3 times a day as needed for  "Muscle Spasms., Disp: 30 Tab, Rfl: 0    ALLERGIES  No Known Allergies    PHYSICAL EXAM  VITAL SIGNS: /66   Pulse (!) 102   Temp 37.4 °C (99.4 °F)   Resp 18   Ht 1.727 m (5' 8\")   Wt 79 kg (174 lb 2.6 oz)   SpO2 95%   BMI 26.48 kg/m²     Constitutional: Well developed, Well nourished, No acute distress, Non-toxic appearance.   HENT: Normocephalic, Atraumatic, Bilateral external ears normal, oropharynx moist, No oral exudates, Nose normal.   Eyes:conjunctiva is normal, there are no signs of exudate.   Neck: Supple, no meningeal signs.  Lymphatic: No lymphadenopathy noted.   Cardiovascular: Regular rate and rhythm without murmurs gallops or rubs.   Thorax & Lungs: No respiratory distress. Breathing comfortably. Lungs are clear to auscultation bilaterally, there are no wheezes no rales. Chest wall is nontender.  Abdomen: Soft, nontender, nondistended. Bowel sounds are present.   Skin: Warm, Dry, No erythema,   Back: No midline tenderness, No CVA tenderness.  Musculoskeletal: Good range of motion in all major joints. No major deformities noted. Intact distal pulses, no clubbing, no cyanosis, no edema, No ecchymosis. Tender about the whole left side soft tissue tenderness.   Neurologic: Alert & oriented x 3, Moving all extremities. No gross abnormalities. Cranial nerves II-XII grossly intact, moving all 4 extremities, 5/5 strength in all 4 extremities, cerebellar functions intact, no other focal abnormalities. DTRs 2-3+ and equal throughout.  Psychiatric: Affect normal, Judgment normal, Mood normal.     COURSE & MEDICAL DECISION MAKING  Pertinent Labs & Imaging studies reviewed. (See chart for details)    2:25 PM - Patient seen and examined at bedside. At this time his pain appears to be muscoloskeltal and chronic in nature. There is no bony tenderness or evidence of trauma. Discussed follow up with this primary for chronic pain control. Patient understands that the ED is not a substitute for this type of " long term care obtained from a primary. I discussed treatment with Lyrica to address some of his acute pain today. Patient is agreeable with this and agrees to discharge. Patient will be treated with Lyrica 75 mg.     The patient will return for new or worsening symptoms and is stable at the time of discharge.    The patient is referred to a primary physician for blood pressure management, diabetic screening, and for all other preventative health concerns.    DISPOSITION:  Patient will be discharged home in stable condition.    FOLLOW UP:  [facility]  [address]  [place] [address]  [phone]          [address] [address]  [phone]  Schedule an appointment as soon as possible for a visit        OUTPATIENT MEDICATIONS:  Discharge Medication List as of 7/9/2018  3:06 PM      START taking these medications    Details   pregabalin (LYRICA) 75 MG Cap Take 1 Cap by mouth 2 times a day for 30 days., Disp-60 Cap, R-0, Print Rx Paper, For 30 days            FINAL IMPRESSION  1. Chronic pain syndrome          Alec MA (Florentin), am scribing for, and in the presence of, Dereje Neves M.D..    Electronically signed by: Alec Castillo (Florentin), 7/9/2018    Dereje MA M.D. personally performed the services described in this documentation, as scribed by Alec Castillo in my presence, and it is both accurate and complete.    The note accurately reflects work and decisions made by me.  Dereje Neves  7/9/2018  4:40 PM

## 2018-08-12 ENCOUNTER — HOSPITAL ENCOUNTER (EMERGENCY)
Facility: MEDICAL CENTER | Age: 48
End: 2018-08-12
Attending: EMERGENCY MEDICINE
Payer: COMMERCIAL

## 2018-08-12 VITALS
OXYGEN SATURATION: 97 % | DIASTOLIC BLOOD PRESSURE: 90 MMHG | HEIGHT: 68 IN | HEART RATE: 105 BPM | SYSTOLIC BLOOD PRESSURE: 134 MMHG | TEMPERATURE: 98 F | BODY MASS INDEX: 26.37 KG/M2 | RESPIRATION RATE: 16 BRPM | WEIGHT: 174 LBS

## 2018-08-12 DIAGNOSIS — R05.9 COUGH: ICD-10-CM

## 2018-08-12 PROCEDURE — 99283 EMERGENCY DEPT VISIT LOW MDM: CPT

## 2018-08-12 RX ORDER — BENZONATATE 100 MG/1
100 CAPSULE ORAL 3 TIMES DAILY PRN
Qty: 60 CAP | Refills: 0 | Status: SHIPPED | OUTPATIENT
Start: 2018-08-12

## 2018-08-12 RX ORDER — ALBUTEROL SULFATE 90 UG/1
2 AEROSOL, METERED RESPIRATORY (INHALATION)
Qty: 1 INHALER | Refills: 2 | Status: SHIPPED | OUTPATIENT
Start: 2018-08-12

## 2018-08-12 ASSESSMENT — ENCOUNTER SYMPTOMS
SINUS PAIN: 1
HEADACHES: 1
SHORTNESS OF BREATH: 0
FEVER: 1
COUGH: 1
VOMITING: 0
NECK PAIN: 0
DIZZINESS: 0
ABDOMINAL PAIN: 0
CHILLS: 1
BACK PAIN: 0
NAUSEA: 0

## 2018-08-12 ASSESSMENT — LIFESTYLE VARIABLES: DO YOU DRINK ALCOHOL: NO

## 2018-08-12 ASSESSMENT — PAIN SCALES - GENERAL
PAINLEVEL_OUTOF10: 6
PAINLEVEL_OUTOF10: 6

## 2018-08-12 NOTE — DISCHARGE INSTRUCTIONS

## 2018-08-12 NOTE — ED NOTES
Received orders for DC. Educated regarding f/u and prescribed medications. Ambulatory to lobby with steady gait.

## 2018-08-12 NOTE — ED PROVIDER NOTES
"ED Provider Note    Scribed for Mario Haile M.D. by Yaakov Trujillo. 8/12/2018, 9:42 AM.    Primary care provider: Pcp Pt States None  Means of arrival: Walk in   History obtained from: Patient   History limited by: None     CHIEF COMPLAINT  Chief Complaint   Patient presents with   • Cough       HPI  Héctor Cain is a 48 y.o. male who presents to the Emergency Department with a cough onset three days ago. The patient also endorses sinus congestion, sore throat, fever, chills and \"burning feeling in my lungs.\" The patient reports that he has not been spitting anything up but has been having dried mucous when blowing his nose. The patient also endorses a mild headache. The patient denies any drug or alcohol use and denies smoking. The patient denies any shortness of breath, chest pain, nausea, vomiting, dizziness, weakness, abdominal pain.     REVIEW OF SYSTEMS  Review of Systems   Constitutional: Positive for chills and fever.   HENT: Positive for congestion and sinus pain.    Respiratory: Positive for cough. Negative for shortness of breath.    Cardiovascular: Negative for chest pain.   Gastrointestinal: Negative for abdominal pain, nausea and vomiting.   Genitourinary: Negative.    Musculoskeletal: Negative for back pain and neck pain.   Neurological: Positive for headaches. Negative for dizziness.     PAST MEDICAL HISTORY   Chronic musculoskeletal pain.    SURGICAL HISTORY  patient denies any surgical history    SOCIAL HISTORY  Social History   Substance Use Topics   • Smoking status: Current Some Day Smoker     Types: Cigarettes   • Smokeless tobacco: Never Used   • Alcohol use Yes      Comment: on occaison      History   Drug Use No       FAMILY HISTORY  No family history on file.    CURRENT MEDICATIONS  No current facility-administered medications on file prior to encounter.      Current Outpatient Prescriptions on File Prior to Encounter   Medication Sig Dispense Refill   • cyclobenzaprine " "(FLEXERIL) 10 MG Tab Take 1 Tab by mouth 3 times a day as needed for Muscle Spasms. 30 Tab 0     ALLERGIES  No Known Allergies    PHYSICAL EXAM  VITAL SIGNS: /90   Pulse (!) 105   Temp 36.7 °C (98 °F)   Resp 16   Ht 1.727 m (5' 8\")   Wt 78.9 kg (174 lb)   SpO2 97%   BMI 26.46 kg/m²     Constitutional: Alert in mild distress.  HENT: Normocephalic, Bilateral external ears normal. Nose normal.   Eyes: Pupils are equal and reactive. Conjunctiva normal, non-icteric.   Thorax & Lungs: Easy unlabored respirations  Abdomen:  No gross signs of peritonitis, no pain with movement   Skin: Visualized skin is  Dry, No erythema, No rash.   Extremities:  No edema, No asymmetry  Neurologic: Alert, Grossly non-focal.   Psychiatric: Affect and Mood normal      COURSE & MEDICAL DECISION MAKING  Pertinent Labs & Imaging studies reviewed. (See chart for details)    9:42 AM - Patient seen and examined at bedside. I spoke to the patient about his presentation and told him I would treat him for his cough but that I would not write for antibiotics as his symptoms have only been going on for three days and this is likely viral. Th patient was completely receptive with his plan of care and agreeable with discharge home at this time.       The patient will return for new or worsening symptoms and is stable at the time of discharge.    DISPOSITION:  Patient will be discharged home in stable condition.    FOLLOW UP:  [address] [address] [phone]        OUTPATIENT MEDICATIONS:  New Prescriptions    ALBUTEROL 108 (90 BASE) MCG/ACT AERO SOLN INHALATION AEROSOL    Inhale 2 Puffs by mouth every 2 hours as needed for Shortness of Breath (or cough).    BENZONATATE (TESSALON) 100 MG CAP    Take 1 Cap by mouth 3 times a day as needed for Cough.       FINAL IMPRESSION  1. Cough          I, Yaakov Trujillo (Scribe), am scribing for, and in the presence of, Mario Haile, " M.D..    Electronically signed by: Yaakov Trujillo (Scribe), 8/12/2018    IMario M.D. personally performed the services described in this documentation, as scribed by Yaakov Trujillo in my presence, and it is both accurate and complete.    The note accurately reflects work and decisions made by me.  Mario Haile  8/12/2018  2:25 PM

## 2018-08-12 NOTE — ED TRIAGE NOTES
Pt with multiple ED visits. Pt likes to be called TRAV, called suzanne and pt got very upset and argumentative, apologized to pt. Pt c/o cough. No distress noted. Pt continues to be verbally aggressive with staff regarding his name. Explained to pt that the name on his chart goes by his Legal name.

## 2018-08-13 ENCOUNTER — HOSPITAL ENCOUNTER (EMERGENCY)
Facility: MEDICAL CENTER | Age: 48
End: 2018-08-13
Attending: EMERGENCY MEDICINE
Payer: COMMERCIAL

## 2018-08-13 VITALS
SYSTOLIC BLOOD PRESSURE: 129 MMHG | OXYGEN SATURATION: 97 % | HEART RATE: 95 BPM | WEIGHT: 177.47 LBS | TEMPERATURE: 98.5 F | DIASTOLIC BLOOD PRESSURE: 85 MMHG | BODY MASS INDEX: 26.98 KG/M2 | RESPIRATION RATE: 18 BRPM

## 2018-08-13 DIAGNOSIS — J06.9 VIRAL UPPER RESPIRATORY TRACT INFECTION WITH COUGH: ICD-10-CM

## 2018-08-13 DIAGNOSIS — R09.81 NASAL CONGESTION: ICD-10-CM

## 2018-08-13 PROCEDURE — 99284 EMERGENCY DEPT VISIT MOD MDM: CPT

## 2018-08-13 PROCEDURE — A9270 NON-COVERED ITEM OR SERVICE: HCPCS | Performed by: EMERGENCY MEDICINE

## 2018-08-13 PROCEDURE — 700102 HCHG RX REV CODE 250 W/ 637 OVERRIDE(OP): Performed by: EMERGENCY MEDICINE

## 2018-08-13 RX ORDER — PSEUDOEPHEDRINE HCL 30 MG
30 TABLET ORAL EVERY 4 HOURS PRN
Qty: 28 TAB | Refills: 6 | Status: SHIPPED | OUTPATIENT
Start: 2018-08-13 | End: 2018-08-20

## 2018-08-13 RX ORDER — PSEUDOEPHEDRINE HYDROCHLORIDE 60 MG/1
60 TABLET, FILM COATED ORAL ONCE
Status: COMPLETED | OUTPATIENT
Start: 2018-08-13 | End: 2018-08-13

## 2018-08-13 RX ADMIN — PSEUDOEPHEDRINE HYDROCHLORIDE 60 MG: 60 TABLET, FILM COATED ORAL at 22:09

## 2018-08-13 ASSESSMENT — PAIN SCALES - GENERAL: PAINLEVEL_OUTOF10: 0

## 2018-08-13 NOTE — LETTER
Emergency Services     August 13, 2018    Patient: John Cain   YOB: 1970   Date of Visit: 8/13/2018       To Whom It May Concern:    John Cain was seen and treated in our emergency department on 8/13/2018.  He may return to work/school on 08/14/18.     Sincerely,     KARIME WILLIS M.D.  [facility], EMERGENCY DEPT  Dept: [phone]

## 2018-08-14 NOTE — ED NOTES
"Pt c/o dry cough x 3 days and sinus congestion. Pt reports he was seen for same Sunday. Pt reports rxs given \"Aren't working\". Pt also c/o that symptoms are exacerbated because \"they are freezing me to death where I live\".    "

## 2018-08-14 NOTE — ED TRIAGE NOTES
Chief Complaint   Patient presents with   • Weakness     Per patient it has been going for 4 days.    • Sinus Pain     x's 4 days.        Patient ambulatory to triage room with steady gait. Patient states that he staying at the [facility] [address]. Per patient the air conditioning is on to high and making him sick.       Patient placed back in lobby and updated on triage process.

## 2018-08-14 NOTE — ED NOTES
"D/c instructions and rx reviewed with pt. Pt verbalized understanding. Pt in nad at time of d/c. Pt ambulatory out of department with steady gait. Pt requested work note stating he must remain on \"bedrest\", discussed with erp. Pt provided appropriate work note.   "

## 2018-08-14 NOTE — ED PROVIDER NOTES
ED Provider Note    CHIEF COMPLAINT  Chief Complaint   Patient presents with   • Weakness     Per patient it has been going for 4 days.    • Sinus Pain     x's 4 days.        HPI  John Cain is a 48 y.o. male who presents to the emergency department with chief complaint of nasal congestion and sinus pain.  Patient was seen here yesterday for diagnosis of her upper respiratory viral infection and sent home with albuterol and Tessalon.  The patient states that it has been mildly helping with his cough but he still very congested and has been helping with that at all.  He has not taken anything else over-the-counter for his congestion.  He denies any nausea vomiting or fevers states that where he lives has a very strong air conditioning feels like it is making him worse.      REVIEW OF SYSTEMS  Positives as above. Pertinent negatives include nausea vomiting fevers chills shortness of breath chest pain  All other review of systems are negative    PAST MEDICAL HISTORY       SOCIAL HISTORY  Social History     Social History Main Topics   • Smoking status: Former Smoker     Types: Cigarettes   • Smokeless tobacco: Never Used   • Alcohol use No   • Drug use: No   • Sexual activity: Not on file       SURGICAL HISTORY  patient denies any surgical history    CURRENT MEDICATIONS  Home Medications     Reviewed by Herminia Au R.N. (Registered Nurse) on 08/13/18 at 2109  Med List Status: Partial   Medication Last Dose Status   albuterol 108 (90 Base) MCG/ACT Aero Soln inhalation aerosol  Active   benzonatate (TESSALON) 100 MG Cap  Active   cyclobenzaprine (FLEXERIL) 10 MG Tab  Active                ALLERGIES  No Known Allergies    PHYSICAL EXAM  VITAL SIGNS: /87   Pulse 100   Temp 37.4 °C (99.3 °F)   Resp 18   Wt 80.5 kg (177 lb 7.5 oz)   SpO2 97%   BMI 26.98 kg/m²    Pulse ox interpretation: I interpret this pulse ox as normal.  Constitutional: Alert in no apparent distress.  HENT: Normocephalic, Atraumatic,  MMM, uvula midline no tonsillar exudates no erythema no trismus, bilateral tympanic membranes within normal limits, copious nasal congestion  Eyes: PERound. Conjunctiva normal, non-icteric.   Heart: Regular rate and rythm, no murmurs.    Lungs: Clear to auscultation bilaterally. No resp distress, breath sounds equal  Abdomen: Non-tender, non-distended, normal bowel sounds  Skin: Warm, Dry, No erythema, No rash.   Neurologic: Alert and oriented, Grossly non-focal.       DIFFERENTIAL DIAGNOSIS AND WORK UP PLAN    This is a 48 y.o. male who presents with signs and physical examination consistent with rhinitis secondary likely to a viral syndrome, he is not wheezing is not coughing is otherwise well-appearing with normal vital signs and no hypoxia.  We started on some pseudoephedrine for his congestion and discharged with a prescription to help with the nasal congestion and strict return precautions.    /85   Pulse 95   Temp 36.9 °C (98.5 °F)   Resp 18   Wt 80.5 kg (177 lb 7.5 oz)   SpO2 97%   BMI 26.98 kg/m²       The patient will return for new or worsening symptoms and is stable at the time of discharge.    The patient is referred to a primary physician for blood pressure management, diabetic screening, and for all other preventative health concerns.    DISPOSITION:  Patient will be discharged home in stable condition.    FOLLOW UP:  [facility], Emergency Dept  [address] [facility] [address] [phone]    If symptoms worsen    [address] [address]  [phone]  Schedule an appointment as soon as possible for a visit  for blood pressure recheck      OUTPATIENT MEDICATIONS:  Discharge Medication List as of 8/13/2018  9:59 PM      START taking these medications    Details   pseudoephedrine (SUDAFED) 30 MG Tab Take 1 Tab by mouth every four hours as needed for Congestion for up to 7 days., Disp-28 Tab, R-6, Print Rx Paper                  FINAL IMPRESSION  1. Viral upper respiratory tract infection with cough    2. Nasal congestion                 Electronically signed by: Neema Cash, 8/13/2018 9:27 PM    This dictation has been created using voice recognition software and/or scribes. The accuracy of the dictation is limited by the abilities of the software and the expertise of the scribes. I expect there may be some errors of grammar and possibly content. I made every attempt to manually correct the errors within my dictation. However, errors related to voice recognition software and/or scribes may still exist and should be interpreted within the appropriate context.

## 2018-08-14 NOTE — DISCHARGE INSTRUCTIONS
"Antibiotic Nonuse   Your caregiver felt that the infection or problem was not one that would be helped with an antibiotic.  Infections may be caused by viruses or bacteria. Only a caregiver can tell which one of these is the likely cause of an illness. A cold is the most common cause of infection in both adults and children. A cold is a virus. Antibiotic treatment will have no effect on a viral infection. Viruses can lead to many lost days of work caring for sick children and many missed days of school. Children may catch as many as 10 \"colds\" or \"flus\" per year during which they can be tearful, cranky, and uncomfortable. The goal of treating a virus is aimed at keeping the ill person comfortable.  Antibiotics are medications used to help the body fight bacterial infections. There are relatively few types of bacteria that cause infections but there are hundreds of viruses. While both viruses and bacteria cause infection they are very different types of germs. A viral infection will typically go away by itself within 7 to 10 days. Bacterial infections may spread or get worse without antibiotic treatment.  Examples of bacterial infections are:  · Sore throats (like strep throat or tonsillitis).  · Infection in the lung (pneumonia).  · Ear and skin infections.  Examples of viral infections are:  · Colds or flus.  · Most coughs and bronchitis.  · Sore throats not caused by Strep.  · Runny noses.  It is often best not to take an antibiotic when a viral infection is the cause of the problem. Antibiotics can kill off the helpful bacteria that we have inside our body and allow harmful bacteria to start growing. Antibiotics can cause side effects such as allergies, nausea, and diarrhea without helping to improve the symptoms of the viral infection. Additionally, repeated uses of antibiotics can cause bacteria inside of our body to become resistant. That resistance can be passed onto harmful bacterial. The next time you have " "an infection it may be harder to treat if antibiotics are used when they are not needed. Not treating with antibiotics allows our own immune system to develop and take care of infections more efficiently. Also, antibiotics will work better for us when they are prescribed for bacterial infections.  Treatments for a child that is ill may include:  · Give extra fluids throughout the day to stay hydrated.  · Get plenty of rest.  · Only give your child over-the-counter or prescription medicines for pain, discomfort, or fever as directed by your caregiver.  · The use of a cool mist humidifier may help stuffy noses.  · Cold medications if suggested by your caregiver.  Your caregiver may decide to start you on an antibiotic if:  · The problem you were seen for today continues for a longer length of time than expected.  · You develop a secondary bacterial infection.  SEEK MEDICAL CARE IF:  · Fever lasts longer than 5 days.  · Symptoms continue to get worse after 5 to 7 days or become severe.  · Difficulty in breathing develops.  · Signs of dehydration develop (poor drinking, rare urinating, dark colored urine).  · Changes in behavior or worsening tiredness (listlessness or lethargy).  Document Released: 02/26/2003 Document Revised: 03/11/2013 Document Reviewed: 08/25/2010  [employer]® Patient Information ©2014 [employer].    Upper Respiratory Infection, Adult  Most upper respiratory infections (URIs) are a viral infection of the air passages leading to the lungs. A URI affects the nose, throat, and upper air passages. The most common type of URI is nasopharyngitis and is typically referred to as \"the common cold.\"  URIs run their course and usually go away on their own. Most of the time, a URI does not require medical attention, but sometimes a bacterial infection in the upper airways can follow a viral infection. This is called a secondary infection. Sinus and middle ear infections are common types of secondary upper " respiratory infections.  Bacterial pneumonia can also complicate a URI. A URI can worsen asthma and chronic obstructive pulmonary disease (COPD). Sometimes, these complications can require emergency medical care and may be life threatening.  What are the causes?  Almost all URIs are caused by viruses. A virus is a type of germ and can spread from one person to another.  What increases the risk?  You may be at risk for a URI if:  · You smoke.  · You have chronic heart or lung disease.  · You have a weakened defense (immune) system.  · You are very young or very old.  · You have nasal allergies or asthma.  · You work in crowded or poorly ventilated areas.  · You work in health care facilities or schools.  What are the signs or symptoms?  Symptoms typically develop 2-3 days after you come in contact with a cold virus. Most viral URIs last 7-10 days. However, viral URIs from the influenza virus (flu virus) can last 14-18 days and are typically more severe. Symptoms may include:  · Runny or stuffy (congested) nose.  · Sneezing.  · Cough.  · Sore throat.  · Headache.  · Fatigue.  · Fever.  · Loss of appetite.  · Pain in your forehead, behind your eyes, and over your cheekbones (sinus pain).  · Muscle aches.  How is this diagnosed?  Your health care provider may diagnose a URI by:  · Physical exam.  · Tests to check that your symptoms are not due to another condition such as:  ¨ Strep throat.  ¨ Sinusitis.  ¨ Pneumonia.  ¨ Asthma.  How is this treated?  A URI goes away on its own with time. It cannot be cured with medicines, but medicines may be prescribed or recommended to relieve symptoms. Medicines may help:  · Reduce your fever.  · Reduce your cough.  · Relieve nasal congestion.  Follow these instructions at home:  · Take medicines only as directed by your health care provider.  · Gargle warm saltwater or take cough drops to comfort your throat as directed by your health care provider.  · Use a warm mist humidifier or  inhale steam from a shower to increase air moisture. This may make it easier to breathe.  · Drink enough fluid to keep your urine clear or pale yellow.  · Eat soups and other clear broths and maintain good nutrition.  · Rest as needed.  · Return to work when your temperature has returned to normal or as your health care provider advises. You may need to stay home longer to avoid infecting others. You can also use a face mask and careful hand washing to prevent spread of the virus.  · Increase the usage of your inhaler if you have asthma.  · Do not use any tobacco products, including cigarettes, chewing tobacco, or electronic cigarettes. If you need help quitting, ask your health care provider.  How is this prevented?  The best way to protect yourself from getting a cold is to practice good hygiene.  · Avoid oral or hand contact with people with cold symptoms.  · Wash your hands often if contact occurs.  There is no clear evidence that vitamin C, vitamin E, echinacea, or exercise reduces the chance of developing a cold. However, it is always recommended to get plenty of rest, exercise, and practice good nutrition.  Contact a health care provider if:  · You are getting worse rather than better.  · Your symptoms are not controlled by medicine.  · You have chills.  · You have worsening shortness of breath.  · You have brown or red mucus.  · You have yellow or brown nasal discharge.  · You have pain in your face, especially when you bend forward.  · You have a fever.  · You have swollen neck glands.  · You have pain while swallowing.  · You have white areas in the back of your throat.  Get help right away if:  · You have severe or persistent:  ¨ Headache.  ¨ Ear pain.  ¨ Sinus pain.  ¨ Chest pain.  · You have chronic lung disease and any of the following:  ¨ Wheezing.  ¨ Prolonged cough.  ¨ Coughing up blood.  ¨ A change in your usual mucus.  · You have a stiff neck.  · You have changes in  your:  ¨ Vision.  ¨ Hearing.  ¨ Thinking.  ¨ Mood.  This information is not intended to replace advice given to you by your health care provider. Make sure you discuss any questions you have with your health care provider.  Document Released: 06/13/2002 Document Revised: 08/20/2017 Document Reviewed: 03/25/2015  [employer]vier Interactive Patient Education © 2017 [employer]

## 2018-11-07 ENCOUNTER — HOSPITAL ENCOUNTER (EMERGENCY)
Facility: MEDICAL CENTER | Age: 48
End: 2018-11-07
Attending: EMERGENCY MEDICINE
Payer: COMMERCIAL

## 2018-11-07 VITALS
DIASTOLIC BLOOD PRESSURE: 84 MMHG | HEIGHT: 68 IN | WEIGHT: 177.25 LBS | HEART RATE: 84 BPM | BODY MASS INDEX: 26.86 KG/M2 | RESPIRATION RATE: 16 BRPM | TEMPERATURE: 97.9 F | SYSTOLIC BLOOD PRESSURE: 118 MMHG | OXYGEN SATURATION: 97 %

## 2018-11-07 DIAGNOSIS — J01.00 ACUTE NON-RECURRENT MAXILLARY SINUSITIS: ICD-10-CM

## 2018-11-07 PROCEDURE — 99283 EMERGENCY DEPT VISIT LOW MDM: CPT

## 2018-11-07 RX ORDER — AMOXICILLIN 500 MG/1
500 CAPSULE ORAL 3 TIMES DAILY
Qty: 30 CAP | Refills: 0 | Status: SHIPPED | OUTPATIENT
Start: 2018-11-07 | End: 2018-11-17

## 2018-11-07 NOTE — ED TRIAGE NOTES
.  Chief Complaint   Patient presents with   • Nasal Congestion     X1 week, patient states yellowish mucus when blowing nose     Patient ambulatory to triage for above complaints; A&O X4; NAD observed in triage.   Patient placed in lobby and educated to notify staff of new or worsening symptoms.

## 2018-11-07 NOTE — ED PROVIDER NOTES
ED Provider Note    Scribed for Erwin Meyer M.D. by Irish Blum. 11/7/2018  12:36 PM    Primary Care Provider: None  Means of arrival: Walk in  History obtained from: Patient  History limited by: None    CHIEF COMPLAINT  •  Nasal Congestion    HPI  John Cain is a 48 y.o. male who presents to the ED for nasal congestion with an onset of 7 days.  Symptoms developed one week ago with a nonproductive cough and nasal congestion. He complains of gradually worsening nasal congestion described as yellow mucus with minimal blood. He denies taking any medications for his symptoms. Associated symptoms include sinus pressure. Negative for fever, chills, sore throat, ear pain, chest pain, shortness of breath, vomiting or diarrhea.      REVIEW OF SYSTEMS  CONSTITUTIONAL:  Denies fever, chills, weight gain/loss or weakness.  ENT:  Positive nasal congestion yellow mucus with minimal blood and sinus pressure. Denies sore throat or ear pain.   CARDIOVASCULAR: Denies chest pain.  RESPIRATORY:  Positive nonproductive cough. Denies shortness of breath or difficulty breathing.  GI:  Denies nausea, vomiting or diarrhea.  SKIN:  No rash.  NEUROLOGIC:  Denies headache.    See HPI for further details. E.       PAST MEDICAL HISTORY  History of chronic pain.      FAMILY HISTORY  History reviewed. No pertinent family history.      SOCIAL HISTORY  Patient reports that he has quit smoking. His smoking use included Cigarettes. He has never used smokeless tobacco. He reports that he does not drink alcohol or use drugs.      SURGICAL HISTORY  Patient denies recent surgeries.       CURRENT MEDICATIONS  Home Medications     Reviewed by Tameka Rodgers R.N. (Registered Nurse) on 11/07/18 at 1104  Med List Status: Unable to Obtain   Medication Last Dose Status   albuterol 108 (90 Base) MCG/ACT Aero Soln inhalation aerosol  Active   benzonatate (TESSALON) 100 MG Cap  Active   cyclobenzaprine (FLEXERIL) 10 MG Tab  Active           "      ALLERGIES  None      PHYSICAL EXAM  VITAL SIGNS: /75   Pulse 80   Temp 36.4 °C (97.6 °F) (Temporal)   Resp 16   Ht 1.727 m (5' 8\")   Wt 80.4 kg (177 lb 4 oz)   SpO2 97%   BMI 26.95 kg/m²      Constitutional: Patient is awake and alert. No acute respiratory distress. Well developed, Well nourished, Non-toxic appearance.  HENT: Normocephalic, Atraumatic, Bilateral external ears normal, Bilateral TM's with minimal fluid behind the TM but no erythema. Oropharynx pink moist with no exudates, Bilateral nose patent. Turbinates are edematous. Maxillary sinus tenderness.  Neck: Anterior neck is midline. Supple no nuchal rigidity, no thyromegaly or mass. Non-tender  Lymphatic: No supraclavicular lymph nodes.   Cardiovascular: Heart is regular rate and rhythm no murmur  Thorax & Lungs: Chest is symmetrical, with good breath sounds. No wheezing or crackles. No respiratory distress.  Musculoskeletal: Moves upper lower extremities well  Neurologic: Alert & oriented.  Strength is symmetric in bilateral upper and lower extremities.    Psychiatric: Normal affect      COURSE & MEDICAL DECISION MAKING  Pertinent Labs & Imaging studies reviewed. (See chart for details)    Differential Diagnosis include but are not limited to: URI or sinus infection.    12:36 PM Patient seen and examined at bedside. Patient presents for nasal congestion.      Discharge plan was discussed with the patient and includes following up with [facility] in one week.  Patient will be discharged with a prescription for Amoxil.       The patient will return for new or persisting symptoms including Amoxil.  The patient verbalizes understanding and will comply.  Patient is stable at the time of discharge.  Vital signs were reviewed: /75   Pulse 80   Temp 36.4 °C (97.6 °F) (Temporal)   Resp 16   Ht 1.727 m (5' 8\")   Wt 80.4 kg (177 lb 4 oz)   SpO2 97%   BMI 26.95 kg/m²        Decision Making:  Patient has an upper " respiratory tract infection possibly sinusitis.  No signs of meningitis or pneumonia.  He would like to try a round of antibiotics for this.  This time he is stable for discharge and need for close follow-up as an outpatient        DISPOSITION  Patient will be discharged home in stable condition.      FOLLOW UP  [address] [address]  [phone]  In 1 week        The patient is referred to a primary physician for blood pressure management, diabetic screening, and for all other preventative health concerns.      OUTPATIENT MEDICATIONS  New Prescriptions    AMOXICILLIN (AMOXIL) 500 MG CAP    Take 1 Cap by mouth 3 times a day for 10 days.         DIAGNOSIS  1. Acute non-recurrent maxillary sinusitis           PLAN  1.  Amoxicillin  2.  Follow-up with [facility] within 7 days  3.  Sinusitis information sheet  4. Return to the emergency department for increased pains, fevers, vomiting or change in condition.        Irish MA (Scribe), am scribing for, and in the presence of, Erwin Meyer M.D.    Electronically signed by: Irish Blum (Scribe), 11/7/2018    Erwin MA M.D. personally performed the services described in this documentation, as scribed by Irish Blum in my presence, and it is both accurate and complete. E.      The note accurately reflects work and decisions made by me.  Erwin Meyer  11/7/2018  3:37 PM

## 2018-11-07 NOTE — ED NOTES
Pt discharged home. Explained discharge and medication instructions. Questions and comments addressed. Pt verbalized understanding of instructions. Pt advised to follow-up with [facility] or return to ED for any new or worsening of symptoms. Pt is ambulating well and steady on feet. VS stable. Pt's ambulatory to ED lobby now.